# Patient Record
Sex: FEMALE | NOT HISPANIC OR LATINO | URBAN - METROPOLITAN AREA
[De-identification: names, ages, dates, MRNs, and addresses within clinical notes are randomized per-mention and may not be internally consistent; named-entity substitution may affect disease eponyms.]

---

## 2018-05-09 ENCOUNTER — IMPORTED ENCOUNTER (OUTPATIENT)
Dept: URBAN - METROPOLITAN AREA CLINIC 31 | Facility: CLINIC | Age: 33
End: 2018-05-09

## 2018-05-09 PROCEDURE — 92004 COMPRE OPH EXAM NEW PT 1/>: CPT

## 2018-05-09 PROCEDURE — 92015 DETERMINE REFRACTIVE STATE: CPT

## 2018-05-09 NOTE — PATIENT DISCUSSION
1.  Refractive error-  Get gls for dist and will use occ. 2. Int in cls but will wait till sees how much she wears anahi gls. 3. 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

## 2022-04-02 ASSESSMENT — VISUAL ACUITY
OD_CC: 20/30-1
OD_SC: 20/20-2
OS_CC: 20/25-1
OS_SC: 20/20